# Patient Record
Sex: MALE | Race: BLACK OR AFRICAN AMERICAN
[De-identification: names, ages, dates, MRNs, and addresses within clinical notes are randomized per-mention and may not be internally consistent; named-entity substitution may affect disease eponyms.]

---

## 2017-04-03 ENCOUNTER — HOSPITAL ENCOUNTER (OUTPATIENT)
Dept: HOSPITAL 35 - CAT | Age: 62
End: 2017-04-03
Attending: NURSE PRACTITIONER
Payer: COMMERCIAL

## 2017-04-03 DIAGNOSIS — Z13.6: Primary | ICD-10-CM

## 2017-06-13 ENCOUNTER — HOSPITAL ENCOUNTER (INPATIENT)
Dept: HOSPITAL 35 - ER | Age: 62
LOS: 4 days | Discharge: HOME | DRG: 189 | End: 2017-06-17
Attending: HOSPITALIST | Admitting: HOSPITALIST
Payer: COMMERCIAL

## 2017-06-13 VITALS — DIASTOLIC BLOOD PRESSURE: 73 MMHG | SYSTOLIC BLOOD PRESSURE: 139 MMHG

## 2017-06-13 VITALS — DIASTOLIC BLOOD PRESSURE: 75 MMHG | SYSTOLIC BLOOD PRESSURE: 129 MMHG

## 2017-06-13 VITALS — BODY MASS INDEX: 22.76 KG/M2 | HEIGHT: 66 IN | WEIGHT: 141.6 LBS

## 2017-06-13 VITALS — SYSTOLIC BLOOD PRESSURE: 129 MMHG | DIASTOLIC BLOOD PRESSURE: 79 MMHG

## 2017-06-13 VITALS — SYSTOLIC BLOOD PRESSURE: 120 MMHG | DIASTOLIC BLOOD PRESSURE: 73 MMHG

## 2017-06-13 DIAGNOSIS — E43: ICD-10-CM

## 2017-06-13 DIAGNOSIS — Z82.49: ICD-10-CM

## 2017-06-13 DIAGNOSIS — E78.5: ICD-10-CM

## 2017-06-13 DIAGNOSIS — J96.22: ICD-10-CM

## 2017-06-13 DIAGNOSIS — J44.1: ICD-10-CM

## 2017-06-13 DIAGNOSIS — Z83.6: ICD-10-CM

## 2017-06-13 DIAGNOSIS — Z79.899: ICD-10-CM

## 2017-06-13 DIAGNOSIS — Z79.82: ICD-10-CM

## 2017-06-13 DIAGNOSIS — J96.21: Primary | ICD-10-CM

## 2017-06-13 DIAGNOSIS — I10: ICD-10-CM

## 2017-06-13 DIAGNOSIS — Z87.891: ICD-10-CM

## 2017-06-13 LAB
ABG SAMPLE TYPE: (no result)
ALBUMIN SERPL-MCNC: 3.5 G/DL (ref 3.4–5)
ALP SERPL-CCNC: 101 U/L (ref 46–116)
ALT SERPL-CCNC: 28 U/L (ref 30–65)
ANION GAP SERPL CALC-SCNC: 4 MMOL/L (ref 7–16)
ANISOCYTOSIS BLD QL SMEAR: (no result)
AST SERPL-CCNC: 26 U/L (ref 15–37)
BE(VIVO): 8.3 MMOL/L
BILIRUB SERPL-MCNC: 0.6 MG/DL
BUN SERPL-MCNC: 13 MG/DL (ref 7–18)
CALCIUM SERPL-MCNC: 9.2 MG/DL (ref 8.5–10.1)
CHLORIDE SERPL-SCNC: 99 MMOL/L (ref 98–107)
CO2 SERPL-SCNC: 34 MMOL/L (ref 21–32)
CREAT SERPL-MCNC: 1 MG/DL (ref 0.7–1.3)
EOSINOPHIL NFR BLD: 7 % (ref 0–3)
ERYTHROCYTE [DISTWIDTH] IN BLOOD BY AUTOMATED COUNT: 13.6 % (ref 10.5–14.5)
GLUCOSE SERPL-MCNC: 85 MG/DL (ref 74–106)
GRANULOCYTES NFR BLD MANUAL: 55 % (ref 36–66)
HCO3 BLD-SCNC: 34.2 MMOL/L (ref 22–26)
HCT VFR BLD CALC: 41.4 % (ref 42–52)
HGB BLD-MCNC: 13.9 GM/DL (ref 14–18)
LACTATE SERPL-SCNC: 1.24 MMOL/L (ref 0.5–2)
LYMPHOCYTES NFR BLD AUTO: 30 % (ref 24–44)
MANUAL DIFFERENTIAL PERFORMED BLD QL: YES
MCH RBC QN AUTO: 29.7 PG (ref 26–34)
MCHC RBC AUTO-ENTMCNC: 33.6 G/DL (ref 28–37)
MCV RBC: 88.4 FL (ref 80–100)
MONOCYTES NFR BLD: 6 % (ref 1–8)
NEUTROPHILS # BLD: 3.9 THOU/UL (ref 1.4–8.2)
NEUTS BAND NFR BLD: 2 % (ref 0–8)
NT-PRO BRAIN NAT PEPTIDE: 11 PG/ML (ref ?–300)
O2(CT): 17.4 ML/DL (ref 15–23)
O2/TOTAL GAS SETTING VFR VENT: 21 %
PCO2 BLD: 52.5 MMHG (ref 35–45)
PLATELET # BLD: 167 THOU/UL (ref 150–400)
PO2 BLD: 52.5 MMHG (ref 80–100)
POTASSIUM SERPL-SCNC: 4.1 MMOL/L (ref 3.5–5.1)
PROT SERPL-MCNC: 7.7 G/DL (ref 6.4–8.2)
RBC # BLD AUTO: 4.68 MIL/UL (ref 4.5–6)
SODIUM SERPL-SCNC: 137 MMOL/L (ref 136–145)
STICK SITE: (no result)
TOTAL CELL COUNT: 100
TROPONIN I SERPL-MCNC: < 0.04 NG/ML
WBC # BLD AUTO: 6.8 THOU/UL (ref 4–11)

## 2017-06-13 PROCEDURE — 10045: CPT

## 2017-06-14 VITALS — DIASTOLIC BLOOD PRESSURE: 74 MMHG | SYSTOLIC BLOOD PRESSURE: 129 MMHG

## 2017-06-14 VITALS — SYSTOLIC BLOOD PRESSURE: 120 MMHG | DIASTOLIC BLOOD PRESSURE: 75 MMHG

## 2017-06-14 VITALS — DIASTOLIC BLOOD PRESSURE: 77 MMHG | SYSTOLIC BLOOD PRESSURE: 132 MMHG

## 2017-06-14 VITALS — DIASTOLIC BLOOD PRESSURE: 62 MMHG | SYSTOLIC BLOOD PRESSURE: 134 MMHG

## 2017-06-14 VITALS — DIASTOLIC BLOOD PRESSURE: 73 MMHG | SYSTOLIC BLOOD PRESSURE: 127 MMHG

## 2017-06-14 VITALS — DIASTOLIC BLOOD PRESSURE: 84 MMHG | SYSTOLIC BLOOD PRESSURE: 151 MMHG

## 2017-06-15 VITALS — DIASTOLIC BLOOD PRESSURE: 68 MMHG | SYSTOLIC BLOOD PRESSURE: 126 MMHG

## 2017-06-15 VITALS — SYSTOLIC BLOOD PRESSURE: 121 MMHG | DIASTOLIC BLOOD PRESSURE: 48 MMHG

## 2017-06-15 VITALS — DIASTOLIC BLOOD PRESSURE: 65 MMHG | SYSTOLIC BLOOD PRESSURE: 120 MMHG

## 2017-06-15 VITALS — DIASTOLIC BLOOD PRESSURE: 68 MMHG | SYSTOLIC BLOOD PRESSURE: 128 MMHG

## 2017-06-16 VITALS — DIASTOLIC BLOOD PRESSURE: 71 MMHG | SYSTOLIC BLOOD PRESSURE: 120 MMHG

## 2017-06-16 VITALS — DIASTOLIC BLOOD PRESSURE: 69 MMHG | SYSTOLIC BLOOD PRESSURE: 115 MMHG

## 2017-06-16 VITALS — SYSTOLIC BLOOD PRESSURE: 117 MMHG | DIASTOLIC BLOOD PRESSURE: 74 MMHG

## 2017-06-16 VITALS — SYSTOLIC BLOOD PRESSURE: 122 MMHG | DIASTOLIC BLOOD PRESSURE: 76 MMHG

## 2017-06-16 VITALS — SYSTOLIC BLOOD PRESSURE: 140 MMHG | DIASTOLIC BLOOD PRESSURE: 82 MMHG

## 2017-06-16 LAB
ANION GAP SERPL CALC-SCNC: 7 MMOL/L (ref 7–16)
BUN SERPL-MCNC: 19 MG/DL (ref 7–18)
CALCIUM SERPL-MCNC: 9.6 MG/DL (ref 8.5–10.1)
CHLORIDE SERPL-SCNC: 100 MMOL/L (ref 98–107)
CO2 SERPL-SCNC: 35 MMOL/L (ref 21–32)
CREAT SERPL-MCNC: 1.1 MG/DL (ref 0.7–1.3)
ERYTHROCYTE [DISTWIDTH] IN BLOOD BY AUTOMATED COUNT: 13.9 % (ref 10.5–14.5)
GLUCOSE SERPL-MCNC: 263 MG/DL (ref 74–106)
HCT VFR BLD CALC: 39.7 % (ref 42–52)
HGB BLD-MCNC: 12.8 GM/DL (ref 14–18)
MCH RBC QN AUTO: 28.9 PG (ref 26–34)
MCHC RBC AUTO-ENTMCNC: 32.2 G/DL (ref 28–37)
MCV RBC: 89.9 FL (ref 80–100)
PLATELET # BLD: 241 THOU/UL (ref 150–400)
POTASSIUM SERPL-SCNC: 4 MMOL/L (ref 3.5–5.1)
RBC # BLD AUTO: 4.41 MIL/UL (ref 4.5–6)
SODIUM SERPL-SCNC: 142 MMOL/L (ref 136–145)
WBC # BLD AUTO: 15.3 THOU/UL (ref 4–11)

## 2017-06-17 VITALS — DIASTOLIC BLOOD PRESSURE: 66 MMHG | SYSTOLIC BLOOD PRESSURE: 113 MMHG

## 2017-06-17 VITALS — SYSTOLIC BLOOD PRESSURE: 111 MMHG | DIASTOLIC BLOOD PRESSURE: 75 MMHG

## 2017-06-17 VITALS — SYSTOLIC BLOOD PRESSURE: 113 MMHG | DIASTOLIC BLOOD PRESSURE: 66 MMHG

## 2017-07-09 ENCOUNTER — HOSPITAL ENCOUNTER (INPATIENT)
Dept: HOSPITAL 35 - ER | Age: 62
LOS: 5 days | Discharge: HOME HEALTH | DRG: 177 | End: 2017-07-14
Attending: HOSPITALIST | Admitting: HOSPITALIST
Payer: COMMERCIAL

## 2017-07-09 VITALS — DIASTOLIC BLOOD PRESSURE: 79 MMHG | SYSTOLIC BLOOD PRESSURE: 127 MMHG

## 2017-07-09 VITALS
HEIGHT: 67.99 IN | DIASTOLIC BLOOD PRESSURE: 73 MMHG | BODY MASS INDEX: 20.46 KG/M2 | WEIGHT: 135 LBS | SYSTOLIC BLOOD PRESSURE: 144 MMHG

## 2017-07-09 DIAGNOSIS — Z99.81: ICD-10-CM

## 2017-07-09 DIAGNOSIS — K56.7: ICD-10-CM

## 2017-07-09 DIAGNOSIS — Z79.82: ICD-10-CM

## 2017-07-09 DIAGNOSIS — J44.0: ICD-10-CM

## 2017-07-09 DIAGNOSIS — Z79.899: ICD-10-CM

## 2017-07-09 DIAGNOSIS — J20.9: ICD-10-CM

## 2017-07-09 DIAGNOSIS — J96.21: ICD-10-CM

## 2017-07-09 DIAGNOSIS — Z82.49: ICD-10-CM

## 2017-07-09 DIAGNOSIS — J44.1: ICD-10-CM

## 2017-07-09 DIAGNOSIS — E11.65: ICD-10-CM

## 2017-07-09 DIAGNOSIS — Z87.891: ICD-10-CM

## 2017-07-09 DIAGNOSIS — J69.0: Primary | ICD-10-CM

## 2017-07-09 DIAGNOSIS — R00.0: ICD-10-CM

## 2017-07-09 LAB
ABG SAMPLE TYPE: (no result)
ALBUMIN SERPL-MCNC: 3.3 G/DL (ref 3.4–5)
ALP SERPL-CCNC: 88 U/L (ref 46–116)
ALT SERPL-CCNC: 34 U/L (ref 30–65)
ANION GAP SERPL CALC-SCNC: 7 MMOL/L (ref 7–16)
AST SERPL-CCNC: 13 U/L (ref 15–37)
BE(VIVO): 4.5 MMOL/L
BILIRUB DIRECT SERPL-MCNC: 0.1 MG/DL
BILIRUB SERPL-MCNC: 0.4 MG/DL
BUN SERPL-MCNC: 17 MG/DL (ref 7–18)
CALCIUM SERPL-MCNC: 9.2 MG/DL (ref 8.5–10.1)
CHLORIDE SERPL-SCNC: 99 MMOL/L (ref 98–107)
CO2 SERPL-SCNC: 32 MMOL/L (ref 21–32)
CREAT SERPL-MCNC: 1.2 MG/DL (ref 0.7–1.3)
ERYTHROCYTE [DISTWIDTH] IN BLOOD BY AUTOMATED COUNT: 14.6 % (ref 10.5–14.5)
GLUCOSE SERPL-MCNC: 317 MG/DL (ref 74–106)
GRANULOCYTES NFR BLD MANUAL: 85 % (ref 36–66)
HCO3 BLD-SCNC: 31.3 MMOL/L (ref 22–26)
HCT VFR BLD CALC: 40.8 % (ref 42–52)
HGB BLD-MCNC: 13.5 GM/DL (ref 14–18)
LACTATE SERPL-SCNC: 4.16 MMOL/L (ref 0.5–2)
LYMPHOCYTES NFR BLD AUTO: 8 % (ref 24–44)
MANUAL DIFFERENTIAL PERFORMED BLD QL: YES
MCH RBC QN AUTO: 29.1 PG (ref 26–34)
MCHC RBC AUTO-ENTMCNC: 33.1 G/DL (ref 28–37)
MCV RBC: 88 FL (ref 80–100)
MONOCYTES NFR BLD: 5 % (ref 1–8)
NEUTROPHILS # BLD: 8.4 THOU/UL (ref 1.4–8.2)
NEUTS BAND NFR BLD: 2 % (ref 0–8)
NT-PRO BRAIN NAT PEPTIDE: 32 PG/ML (ref ?–300)
O2(CT): 18.7 ML/DL (ref 15–23)
PCO2 BLD: 55.7 MMHG (ref 35–45)
PLATELET # BLD: 146 THOU/UL (ref 150–400)
PO2 BLD: 98.8 MMHG (ref 80–100)
POTASSIUM SERPL-SCNC: 4 MMOL/L (ref 3.5–5.1)
PROT SERPL-MCNC: 6.9 G/DL (ref 6.4–8.2)
RBC # BLD AUTO: 4.63 MIL/UL (ref 4.5–6)
SODIUM SERPL-SCNC: 138 MMOL/L (ref 136–145)
STICK SITE: (no result)
TOTAL CELL COUNT: 100
TROPONIN I SERPL-MCNC: < 0.04 NG/ML
WBC # BLD AUTO: 9.6 THOU/UL (ref 4–11)

## 2017-07-09 PROCEDURE — 10086: CPT

## 2017-07-10 VITALS — DIASTOLIC BLOOD PRESSURE: 66 MMHG | SYSTOLIC BLOOD PRESSURE: 138 MMHG

## 2017-07-10 VITALS — SYSTOLIC BLOOD PRESSURE: 120 MMHG | DIASTOLIC BLOOD PRESSURE: 67 MMHG

## 2017-07-10 VITALS — DIASTOLIC BLOOD PRESSURE: 68 MMHG | SYSTOLIC BLOOD PRESSURE: 112 MMHG

## 2017-07-10 VITALS — SYSTOLIC BLOOD PRESSURE: 156 MMHG | DIASTOLIC BLOOD PRESSURE: 72 MMHG

## 2017-07-10 VITALS — DIASTOLIC BLOOD PRESSURE: 64 MMHG | SYSTOLIC BLOOD PRESSURE: 109 MMHG

## 2017-07-10 LAB
ANION GAP SERPL CALC-SCNC: 6 MMOL/L (ref 7–16)
BUN SERPL-MCNC: 16 MG/DL (ref 7–18)
CALCIUM SERPL-MCNC: 9 MG/DL (ref 8.5–10.1)
CHLORIDE SERPL-SCNC: 98 MMOL/L (ref 98–107)
CO2 SERPL-SCNC: 36 MMOL/L (ref 21–32)
CREAT SERPL-MCNC: 1 MG/DL (ref 0.7–1.3)
ERYTHROCYTE [DISTWIDTH] IN BLOOD BY AUTOMATED COUNT: 14.9 % (ref 10.5–14.5)
GLUCOSE SERPL-MCNC: 192 MG/DL (ref 74–106)
HCT VFR BLD CALC: 39.6 % (ref 42–52)
HGB BLD-MCNC: 13.2 GM/DL (ref 14–18)
MCH RBC QN AUTO: 29.2 PG (ref 26–34)
MCHC RBC AUTO-ENTMCNC: 33.3 G/DL (ref 28–37)
MCV RBC: 87.8 FL (ref 80–100)
PLATELET # BLD: 149 THOU/UL (ref 150–400)
POTASSIUM SERPL-SCNC: 4.3 MMOL/L (ref 3.5–5.1)
RBC # BLD AUTO: 4.51 MIL/UL (ref 4.5–6)
SODIUM SERPL-SCNC: 140 MMOL/L (ref 136–145)
WBC # BLD AUTO: 9.2 THOU/UL (ref 4–11)

## 2017-07-11 VITALS — DIASTOLIC BLOOD PRESSURE: 65 MMHG | SYSTOLIC BLOOD PRESSURE: 118 MMHG

## 2017-07-11 VITALS — SYSTOLIC BLOOD PRESSURE: 141 MMHG | DIASTOLIC BLOOD PRESSURE: 65 MMHG

## 2017-07-11 VITALS — SYSTOLIC BLOOD PRESSURE: 107 MMHG | DIASTOLIC BLOOD PRESSURE: 54 MMHG

## 2017-07-11 VITALS — SYSTOLIC BLOOD PRESSURE: 138 MMHG | DIASTOLIC BLOOD PRESSURE: 68 MMHG

## 2017-07-11 LAB
ANION GAP SERPL CALC-SCNC: 3 MMOL/L (ref 7–16)
BUN SERPL-MCNC: 24 MG/DL (ref 7–18)
CALCIUM SERPL-MCNC: 9.4 MG/DL (ref 8.5–10.1)
CHLORIDE SERPL-SCNC: 99 MMOL/L (ref 98–107)
CO2 SERPL-SCNC: 37 MMOL/L (ref 21–32)
CREAT SERPL-MCNC: 1.2 MG/DL (ref 0.7–1.3)
ERYTHROCYTE [DISTWIDTH] IN BLOOD BY AUTOMATED COUNT: 14.6 % (ref 10.5–14.5)
GLUCOSE SERPL-MCNC: 240 MG/DL (ref 74–106)
HCT VFR BLD CALC: 38.4 % (ref 42–52)
HGB BLD-MCNC: 12.8 GM/DL (ref 14–18)
MCH RBC QN AUTO: 29.5 PG (ref 26–34)
MCHC RBC AUTO-ENTMCNC: 33.3 G/DL (ref 28–37)
MCV RBC: 88.6 FL (ref 80–100)
PLATELET # BLD: 151 THOU/UL (ref 150–400)
POTASSIUM SERPL-SCNC: 4.1 MMOL/L (ref 3.5–5.1)
RBC # BLD AUTO: 4.33 MIL/UL (ref 4.5–6)
SODIUM SERPL-SCNC: 139 MMOL/L (ref 136–145)
WBC # BLD AUTO: 7.5 THOU/UL (ref 4–11)

## 2017-07-12 VITALS — SYSTOLIC BLOOD PRESSURE: 111 MMHG | DIASTOLIC BLOOD PRESSURE: 51 MMHG

## 2017-07-12 VITALS — DIASTOLIC BLOOD PRESSURE: 82 MMHG | SYSTOLIC BLOOD PRESSURE: 146 MMHG

## 2017-07-12 VITALS — DIASTOLIC BLOOD PRESSURE: 64 MMHG | SYSTOLIC BLOOD PRESSURE: 129 MMHG

## 2017-07-12 LAB
ANION GAP SERPL CALC-SCNC: 3 MMOL/L (ref 7–16)
BUN SERPL-MCNC: 23 MG/DL (ref 7–18)
CALCIUM SERPL-MCNC: 9 MG/DL (ref 8.5–10.1)
CHLORIDE SERPL-SCNC: 99 MMOL/L (ref 98–107)
CO2 SERPL-SCNC: 37 MMOL/L (ref 21–32)
CREAT SERPL-MCNC: 1 MG/DL (ref 0.7–1.3)
ERYTHROCYTE [DISTWIDTH] IN BLOOD BY AUTOMATED COUNT: 14.9 % (ref 10.5–14.5)
GLUCOSE SERPL-MCNC: 198 MG/DL (ref 74–106)
HCT VFR BLD CALC: 36.8 % (ref 42–52)
HGB BLD-MCNC: 12.4 GM/DL (ref 14–18)
MCH RBC QN AUTO: 29.6 PG (ref 26–34)
MCHC RBC AUTO-ENTMCNC: 33.8 G/DL (ref 28–37)
MCV RBC: 87.6 FL (ref 80–100)
PLATELET # BLD: 161 THOU/UL (ref 150–400)
POTASSIUM SERPL-SCNC: 4.1 MMOL/L (ref 3.5–5.1)
RBC # BLD AUTO: 4.2 MIL/UL (ref 4.5–6)
SODIUM SERPL-SCNC: 139 MMOL/L (ref 136–145)
WBC # BLD AUTO: 9.5 THOU/UL (ref 4–11)

## 2017-07-13 VITALS — DIASTOLIC BLOOD PRESSURE: 75 MMHG | SYSTOLIC BLOOD PRESSURE: 124 MMHG

## 2017-07-13 VITALS — SYSTOLIC BLOOD PRESSURE: 112 MMHG | DIASTOLIC BLOOD PRESSURE: 63 MMHG

## 2017-07-13 VITALS — SYSTOLIC BLOOD PRESSURE: 125 MMHG | DIASTOLIC BLOOD PRESSURE: 65 MMHG

## 2017-07-13 VITALS — DIASTOLIC BLOOD PRESSURE: 62 MMHG | SYSTOLIC BLOOD PRESSURE: 123 MMHG

## 2017-07-13 LAB
ANION GAP SERPL CALC-SCNC: 3 MMOL/L (ref 7–16)
BUN SERPL-MCNC: 24 MG/DL (ref 7–18)
CALCIUM SERPL-MCNC: 9.2 MG/DL (ref 8.5–10.1)
CHLORIDE SERPL-SCNC: 98 MMOL/L (ref 98–107)
CO2 SERPL-SCNC: 41 MMOL/L (ref 21–32)
CREAT SERPL-MCNC: 1.1 MG/DL (ref 0.7–1.3)
ERYTHROCYTE [DISTWIDTH] IN BLOOD BY AUTOMATED COUNT: 14.5 % (ref 10.5–14.5)
GLUCOSE SERPL-MCNC: 184 MG/DL (ref 74–106)
GRANULOCYTES NFR BLD MANUAL: 86 % (ref 36–66)
HCT VFR BLD CALC: 37.6 % (ref 42–52)
HGB BLD-MCNC: 12.6 GM/DL (ref 14–18)
LYMPHOCYTES NFR BLD AUTO: 11 % (ref 24–44)
MANUAL DIFFERENTIAL PERFORMED BLD QL: YES
MCH RBC QN AUTO: 29.4 PG (ref 26–34)
MCHC RBC AUTO-ENTMCNC: 33.5 G/DL (ref 28–37)
MCV RBC: 87.7 FL (ref 80–100)
MONOCYTES NFR BLD: 3 % (ref 1–8)
NEUTROPHILS # BLD: 7.7 THOU/UL (ref 1.4–8.2)
PLATELET # BLD EST: NORMAL 10*3/UL
PLATELET # BLD: 161 THOU/UL (ref 150–400)
POTASSIUM SERPL-SCNC: 4.4 MMOL/L (ref 3.5–5.1)
RBC # BLD AUTO: 4.29 MIL/UL (ref 4.5–6)
RBC MORPH BLD: NORMAL
SODIUM SERPL-SCNC: 142 MMOL/L (ref 136–145)
TOTAL CELL COUNT: 100
WBC # BLD AUTO: 9 THOU/UL (ref 4–11)

## 2017-07-14 VITALS — DIASTOLIC BLOOD PRESSURE: 62 MMHG | SYSTOLIC BLOOD PRESSURE: 111 MMHG

## 2017-07-14 VITALS — SYSTOLIC BLOOD PRESSURE: 111 MMHG | DIASTOLIC BLOOD PRESSURE: 62 MMHG

## 2017-07-14 VITALS — DIASTOLIC BLOOD PRESSURE: 62 MMHG | SYSTOLIC BLOOD PRESSURE: 115 MMHG

## 2017-08-24 ENCOUNTER — HOSPITAL ENCOUNTER (OUTPATIENT)
Dept: HOSPITAL 35 - ULTRA | Age: 62
End: 2017-08-24
Attending: OTOLARYNGOLOGY
Payer: COMMERCIAL

## 2017-08-24 DIAGNOSIS — E04.2: Primary | ICD-10-CM

## 2019-01-23 ENCOUNTER — HOSPITAL ENCOUNTER (INPATIENT)
Dept: HOSPITAL 35 - ER | Age: 64
LOS: 6 days | Discharge: HOME | DRG: 202 | End: 2019-01-29
Attending: INTERNAL MEDICINE | Admitting: INTERNAL MEDICINE
Payer: COMMERCIAL

## 2019-01-23 VITALS — DIASTOLIC BLOOD PRESSURE: 80 MMHG | SYSTOLIC BLOOD PRESSURE: 129 MMHG

## 2019-01-23 VITALS — DIASTOLIC BLOOD PRESSURE: 74 MMHG | SYSTOLIC BLOOD PRESSURE: 157 MMHG

## 2019-01-23 VITALS — SYSTOLIC BLOOD PRESSURE: 119 MMHG | DIASTOLIC BLOOD PRESSURE: 79 MMHG

## 2019-01-23 VITALS — WEIGHT: 137 LBS | BODY MASS INDEX: 21.5 KG/M2 | HEIGHT: 67.01 IN

## 2019-01-23 VITALS — SYSTOLIC BLOOD PRESSURE: 136 MMHG | DIASTOLIC BLOOD PRESSURE: 65 MMHG

## 2019-01-23 DIAGNOSIS — Z87.891: ICD-10-CM

## 2019-01-23 DIAGNOSIS — J20.9: Primary | ICD-10-CM

## 2019-01-23 DIAGNOSIS — J44.0: ICD-10-CM

## 2019-01-23 DIAGNOSIS — J44.1: ICD-10-CM

## 2019-01-23 DIAGNOSIS — Z79.51: ICD-10-CM

## 2019-01-23 DIAGNOSIS — Z79.899: ICD-10-CM

## 2019-01-23 LAB
ALBUMIN SERPL-MCNC: 4 G/DL (ref 3.4–5)
ALT SERPL-CCNC: 33 U/L (ref 30–65)
ANION GAP SERPL CALC-SCNC: 4 MMOL/L (ref 7–16)
AST SERPL-CCNC: 29 U/L (ref 15–37)
BASOPHILS NFR BLD AUTO: 0.7 % (ref 0–2)
BILIRUB SERPL-MCNC: 0.4 MG/DL
BUN SERPL-MCNC: 11 MG/DL (ref 7–18)
CALCIUM SERPL-MCNC: 9.6 MG/DL (ref 8.5–10.1)
CHLORIDE SERPL-SCNC: 101 MMOL/L (ref 98–107)
CO2 SERPL-SCNC: 35 MMOL/L (ref 21–32)
CREAT SERPL-MCNC: 1.3 MG/DL (ref 0.7–1.3)
EOSINOPHIL NFR BLD: 4.6 % (ref 0–3)
ERYTHROCYTE [DISTWIDTH] IN BLOOD BY AUTOMATED COUNT: 13.6 % (ref 10.5–14.5)
GLUCOSE SERPL-MCNC: 114 MG/DL (ref 74–106)
GRANULOCYTES NFR BLD MANUAL: 65.8 % (ref 36–66)
HCT VFR BLD CALC: 38.8 % (ref 42–52)
HGB BLD-MCNC: 12.9 GM/DL (ref 14–18)
LYMPHOCYTES NFR BLD AUTO: 19.4 % (ref 24–44)
MCH RBC QN AUTO: 28.4 PG (ref 26–34)
MCHC RBC AUTO-ENTMCNC: 33.2 G/DL (ref 28–37)
MCV RBC: 85.5 FL (ref 80–100)
MONOCYTES NFR BLD: 9.5 % (ref 1–8)
NEUTROPHILS # BLD: 5.3 THOU/UL (ref 1.4–8.2)
PLATELET # BLD: 236 THOU/UL (ref 150–400)
POTASSIUM SERPL-SCNC: 4.2 MMOL/L (ref 3.5–5.1)
PROT SERPL-MCNC: 8 G/DL (ref 6.4–8.2)
RBC # BLD AUTO: 4.54 MIL/UL (ref 4.5–6)
SODIUM SERPL-SCNC: 140 MMOL/L (ref 136–145)
TROPONIN I SERPL-MCNC: <0.06 NG/ML (ref ?–0.06)
WBC # BLD AUTO: 8 THOU/UL (ref 4–11)

## 2019-01-23 PROCEDURE — 10045: CPT

## 2019-01-23 PROCEDURE — 15002 WOUND PREP TRK/ARM/LEG: CPT

## 2019-01-23 NOTE — EKG
[email or web address]  [address]
Phone:  [phone]                    ELECTROCARDIOGRAM REPORT      
_______________________________________________________________________________
 
Name:       LESLEYARNIE ROSSI        [facility]                     [id number]     Account #:      [id number]  
Admission:  19    Attend Phys:                          
Discharge:              Date of Birth:  [birth year]  
                                                          Report #: [id number]
   [id number]
_______________________________________________________________________________
THIS REPORT FOR:   //name//                          
 
                         [facility] ED
                                       
Test Date:    2019               Test Time:    14:54:14
Pat Name:     ARNIE SUTTON             Department:   
Patient ID:   [id number]            Room:          
Gender:                               Technician:   ZA
:          1955               Requested By: Lily Beard
Order Number: [id number] MD:   Silvino Lubin
                                 Measurements
Intervals                              Axis          
Rate:         99                       P:            66
AL:           116                      QRS:          84
QRSD:         75                       T:            33
QT:           344                                    
QTc:          442                                    
                           Interpretive Statements
Sinus rhythm
Borderline right axis deviation
Borderline low voltage, extremity leads
Compared to ECG 2017 16:47:33
No significant changes
 
Electronically Signed On 2019 16:34:59 CST by Silvino Lubin
[email or web address]
 
 
 
 
 
 
 
 
 
 
 
 
 
 
 
 
 
  <ELECTRONICALLY SIGNED>
   By: Silvino Lubin MD        
  19     1634
D: 19 1454                           _____________________________________
T: 19 145                           Silvino Lubin MD          /MARV

## 2019-01-24 VITALS — DIASTOLIC BLOOD PRESSURE: 59 MMHG | SYSTOLIC BLOOD PRESSURE: 123 MMHG

## 2019-01-24 VITALS — DIASTOLIC BLOOD PRESSURE: 72 MMHG | SYSTOLIC BLOOD PRESSURE: 125 MMHG

## 2019-01-24 VITALS — DIASTOLIC BLOOD PRESSURE: 53 MMHG | SYSTOLIC BLOOD PRESSURE: 131 MMHG

## 2019-01-24 NOTE — NUR
PT ADMITTED RELATED TO COPD EXACERBATION. CM REVEIWED CHART AND SPOKE WITH
CARE TEAM. CM MET WITH PT AT BEDSIDE THIS DAY. PT IS A&O X4. CM ROLE
INTRODUCED. PT INDICATED HE LIVES IN A HOUSE WITH HIS SPOUSE WITH 1 STEPS TO
ENTER AND NO STEPS INSIDE. PT INDICATED HE HAD BEEN INDEPENDENT WITH GAIT AND
ADLS PTA. PT INDICATED HE HAS HOME O2 AND A CPAP THROUGH [facility]. PT INDICATED
NO HH HX. CM TO FOLLOW AS INDICATED WITH DC PLANNING.

## 2019-01-25 VITALS — DIASTOLIC BLOOD PRESSURE: 68 MMHG | SYSTOLIC BLOOD PRESSURE: 116 MMHG

## 2019-01-25 VITALS — SYSTOLIC BLOOD PRESSURE: 122 MMHG | DIASTOLIC BLOOD PRESSURE: 68 MMHG

## 2019-01-25 VITALS — SYSTOLIC BLOOD PRESSURE: 121 MMHG | DIASTOLIC BLOOD PRESSURE: 66 MMHG

## 2019-01-25 VITALS — SYSTOLIC BLOOD PRESSURE: 135 MMHG | DIASTOLIC BLOOD PRESSURE: 57 MMHG

## 2019-01-25 VITALS — SYSTOLIC BLOOD PRESSURE: 108 MMHG | DIASTOLIC BLOOD PRESSURE: 66 MMHG

## 2019-01-25 NOTE — NUR
REC PT AROUND 1515, VS TAKEN, INTRO'D TO ROOM/CALL LIGHT, 02 AT 3L, CHRONIC
USE AT HOME, ENCOURAGED PT TO USE CALL LIGHT FOR ANY NEEDS

## 2019-01-25 NOTE — NUR
PROGRESS
 
PT A/O X4, UP AD JOSE. DENIES PAIN OR ANY NEEDS, IV SOLUMEDROL GIVEN AS
ORDERED. PT SLEPT MOST OF NIGHT.

## 2019-01-26 VITALS — DIASTOLIC BLOOD PRESSURE: 63 MMHG | SYSTOLIC BLOOD PRESSURE: 117 MMHG

## 2019-01-26 VITALS — DIASTOLIC BLOOD PRESSURE: 74 MMHG | SYSTOLIC BLOOD PRESSURE: 147 MMHG

## 2019-01-26 NOTE — NUR
ASSUMED PT CARE AT 0700. ASSESSMENT COMPLETE AND IS AS CHARTED AT 0930. VITAL
SIGNS STABLE. PT ON 3L O2 AND IS ON THIS AT HOME. PT ON SIDE OF BED FINISHING
BREAKFAST. DOES NOT APPEAR IN ACUTE DISTRESS. LUNGS ARE DIMINISHED WITH
CRACKLES IN THE BASES. PT REPORTS PAIN TO HIS RECTUM RATED 8/10. HYDROCODONE
GIVEN AS ORDERED. PT IS ALERT/ORIENTED X4. NO NEW CONCERNS OR COMPLAINTS AT
THIS TIME. WILL CONTINUE WITH CURRENT CARE.

## 2019-01-26 NOTE — NUR
ASSUMED CARE OF PATIENT AT 1900. VSS. ASSESSMENT COMPLETED AT 2215 AND IS AS
DOCUMENTED. LEFT AC PIV PATENT AND SALINE LOCKED. CONTINUES ON 3L O2 VIA NC.
PRN NORCO GIVEN FOR C/O RECTUM PAIN WITH DESIRED EFFECT ACHIEVED. PT CURRENTLY
SLEEPING SOUNDLY IN BED IN NO ACUTE DISTRESS. CALL LIGHT WITHIN REACH. BED
LOCKED AND IN LOWEST POSITION. WCTM.

## 2019-01-26 NOTE — NUR
PT DOING WELL THIS SHIFT. HAD SHOWER EARLIER TODAY AND SOME SHORTNESS OF
BREATH WAS NOTED. PT STATES THAT IS NORMAL WITH ACTIVITY AT HOME. PT IN NO
ACUTE DISTRESS NOW. PT UP AND AROUND IN ROOM WITH NO PROBLEMS. WILL CONTINUE
TO MONITOR.

## 2019-01-27 VITALS — DIASTOLIC BLOOD PRESSURE: 62 MMHG | SYSTOLIC BLOOD PRESSURE: 137 MMHG

## 2019-01-27 VITALS — SYSTOLIC BLOOD PRESSURE: 114 MMHG | DIASTOLIC BLOOD PRESSURE: 61 MMHG

## 2019-01-27 NOTE — NUR
ASSUMED CARE OF PATIENT THIS MORNING. PATIENT IS ALERT AND ORIENTED X4. HE IS
UP AD JOSE. PATIENT WAS ASSESSED THIS MORNING BREATH SOUNDS WERE DIMINISHED
WITH SOME WHEEZING HEARD UPON ASCULTATION. HYPOACTIVE BOWEL SOUNDS, LAST BOWEL
MOVEMENT WAS YESTERDAY. PATIENT IS ON METHYLPREDNISOLONE AND GETS BLOOD SUGAR
CHECKED AC/HS. HE RECEIVES IV ANTIBIOTIC LEVOFLOXACIN. HE WEARS 3L OF OXYGEN.
PATIENT WILL POSSIBLY BE DISCHARGED LATER THIS AFTERNOON. HE IS CURRENTLY
SITTING IN CHAIR WITH CALL LIGHT WITHIN REACH. PATIENT CALLS OUT
APPROPRIATELY.

## 2019-01-27 NOTE — NUR
ASSUMED CARE OF PATIENT AT 1900. VSS. ASSESSMENT COMPLETED AT 2131 AND IS AS
DOCUMENTED. PRN NORCO GIVEN FOR C/O BUTTOCK PAIN WITH DESIRED EFFECT ACHIEVED.
LUNG SOUNDS VERY DIMINISHED ON EXPIRATION, SOME CRACKLES NOTED TO BASES. O2 @
3L VIA NC WHICH IS HOME BASELINE. NO SOA OR DISTRESS NOTED OR REPORTED LAST
NIGHT. LEFT FA PIV PATENT AND SALINE LOCKED. PT CURRENTLY SLEEPING SOUNDLY IN
BED IN NO ACUTE DISTRESS. CALL LIGHT WITHIN REACH. BED LOCKED AND IN LOWEST
POSITION. WCTM.

## 2019-01-28 VITALS — DIASTOLIC BLOOD PRESSURE: 69 MMHG | SYSTOLIC BLOOD PRESSURE: 131 MMHG

## 2019-01-28 VITALS — DIASTOLIC BLOOD PRESSURE: 80 MMHG | SYSTOLIC BLOOD PRESSURE: 131 MMHG

## 2019-01-28 NOTE — NUR
ASSUMED CARE OF PATIENT THIS MORNING. PATIENT IS UP AD JOSE. PATIENT DOES NOT
COMPLAIN OF ANY PAIN. PATIENT WAS ASSESSED BREATH SOUNDS WERE DIMINISHED
W/CRACKLES. PATIENT RECEIVES IV ANTIOBIOTICS. PATIENT WANTS TO STAY ANOTHER
DAY SO HE CAN GET A LITTLE STRONGER TO DISCHARGE TOMORROW, OK WITH PHYSICIAN.
HE IS ON 3L OF OXYGEN. LAST BOWEL MOVEMENT WAS YESTERDAY. PATIENT IS CURRENTLY
LAYING IN BED WITH HIS CALL LIGHT WITHIN REACH. PATIENT CALLS OUT
APPROPRIATELY FOR ASSISTANCE.

## 2019-01-28 NOTE — NUR
CARE TEAM INDICATED THAT THEY ANTICIPATE THAT PT WILL BE MEDICALLY STABLE TO
DISCHARGE HOME TOMORROW TUESDAY 1/29/19. IT IS ANTICIPATED THAT PT HAS ALL
RECOMMENDED DME. CM TO FOLLOW AS INDICATED SHOULD ANY NEEDS ARISE.

## 2019-01-29 VITALS — SYSTOLIC BLOOD PRESSURE: 131 MMHG | DIASTOLIC BLOOD PRESSURE: 69 MMHG

## 2019-01-29 VITALS — SYSTOLIC BLOOD PRESSURE: 128 MMHG | DIASTOLIC BLOOD PRESSURE: 62 MMHG

## 2019-01-29 NOTE — NUR
PATIENT ALERT AND ORIENTED X4.  C/O PAIN X1, MED GIVEN WITH GOOD RESULTS.
LUNGS COARSE AND STATES IS CONGESTED.  FLONASE WAS ORDERED AND USED.
ACCUCHECK , 3UNITS LISPRO INSULIN WAS GIVEN.  UP IN ROOM BY SELF.
SLEPT VERY LITTLE THIS SHIFT.

## 2019-01-29 NOTE — NUR
DISCHARGE NOTE:
SW reviewed chart and spoke with nursing and attending physician. Pt is
medically stable for discharge home today. Pt's family to provide
transportation home.  No SW discharge needs identified at this time, but is
available to assist should needs arise.

## 2019-03-19 ENCOUNTER — HOSPITAL ENCOUNTER (OUTPATIENT)
Dept: HOSPITAL 35 - CAT | Age: 64
End: 2019-03-19
Attending: INTERNAL MEDICINE
Payer: COMMERCIAL

## 2019-03-19 ENCOUNTER — HOSPITAL ENCOUNTER (EMERGENCY)
Dept: HOSPITAL 35 - ER | Age: 64
Discharge: HOME | End: 2019-03-19
Payer: COMMERCIAL

## 2019-03-19 VITALS — SYSTOLIC BLOOD PRESSURE: 101 MMHG | DIASTOLIC BLOOD PRESSURE: 67 MMHG

## 2019-03-19 VITALS — WEIGHT: 135.01 LBS | HEIGHT: 65 IN | BODY MASS INDEX: 22.49 KG/M2

## 2019-03-19 DIAGNOSIS — R19.09: ICD-10-CM

## 2019-03-19 DIAGNOSIS — K62.89: Primary | ICD-10-CM

## 2019-03-19 DIAGNOSIS — J44.9: ICD-10-CM

## 2019-03-19 DIAGNOSIS — K76.0: Primary | ICD-10-CM

## 2019-03-19 DIAGNOSIS — Z87.891: ICD-10-CM

## 2019-03-19 DIAGNOSIS — N28.9: ICD-10-CM

## 2019-03-19 LAB
ALBUMIN SERPL-MCNC: 3 G/DL (ref 3.4–5)
ALT SERPL-CCNC: 27 U/L (ref 30–65)
ANION GAP SERPL CALC-SCNC: 7 MMOL/L (ref 7–16)
AST SERPL-CCNC: 32 U/L (ref 15–37)
BASOPHILS NFR BLD AUTO: 0.9 % (ref 0–2)
BE(VIVO): 9.3 MMOL/L
BILIRUB SERPL-MCNC: 0.5 MG/DL
BUN SERPL-MCNC: 14 MG/DL (ref 7–18)
CALCIUM SERPL-MCNC: 9.2 MG/DL (ref 8.5–10.1)
CHLORIDE SERPL-SCNC: 95 MMOL/L (ref 98–107)
CO2 SERPL-SCNC: 37 MMOL/L (ref 21–32)
CREAT SERPL-MCNC: 1.5 MG/DL (ref 0.7–1.3)
EOSINOPHIL NFR BLD: 5.2 % (ref 0–3)
ERYTHROCYTE [DISTWIDTH] IN BLOOD BY AUTOMATED COUNT: 14 % (ref 10.5–14.5)
GLUCOSE SERPL-MCNC: 155 MG/DL (ref 74–106)
GRANULOCYTES NFR BLD MANUAL: 72.5 % (ref 36–66)
HCO3 BLD-SCNC: 35.3 MMOL/L (ref 22–26)
HCT VFR BLD CALC: 33.7 % (ref 42–52)
HGB BLD-MCNC: 11.1 GM/DL (ref 14–18)
LYMPHOCYTES NFR BLD AUTO: 12.5 % (ref 24–44)
MCH RBC QN AUTO: 27.4 PG (ref 26–34)
MCHC RBC AUTO-ENTMCNC: 33 G/DL (ref 28–37)
MCV RBC: 83.2 FL (ref 80–100)
MONOCYTES NFR BLD: 8.9 % (ref 1–8)
NEUTROPHILS # BLD: 7.5 THOU/UL (ref 1.4–8.2)
PCO2 BLD: 55.9 MMHG (ref 35–45)
PLATELET # BLD: 359 THOU/UL (ref 150–400)
PO2 BLD: 129.5 MMHG (ref 80–100)
POTASSIUM SERPL-SCNC: 3.3 MMOL/L (ref 3.5–5.1)
PROT SERPL-MCNC: 7.1 G/DL (ref 6.4–8.2)
RBC # BLD AUTO: 4.05 MIL/UL (ref 4.5–6)
SODIUM SERPL-SCNC: 139 MMOL/L (ref 136–145)
TROPONIN I SERPL-MCNC: <0.06 NG/ML (ref ?–0.06)
WBC # BLD AUTO: 10.4 THOU/UL (ref 4–11)